# Patient Record
Sex: MALE | URBAN - METROPOLITAN AREA
[De-identification: names, ages, dates, MRNs, and addresses within clinical notes are randomized per-mention and may not be internally consistent; named-entity substitution may affect disease eponyms.]

---

## 2024-01-17 ENCOUNTER — OFFICE VISIT (OUTPATIENT)
Dept: URBAN - METROPOLITAN AREA CLINIC 63 | Facility: CLINIC | Age: 74
End: 2024-01-17
Payer: MEDICARE

## 2024-01-17 VITALS
BODY MASS INDEX: 28.92 KG/M2 | WEIGHT: 232.6 LBS | HEIGHT: 75 IN | HEART RATE: 77 BPM | SYSTOLIC BLOOD PRESSURE: 140 MMHG | TEMPERATURE: 98.6 F | DIASTOLIC BLOOD PRESSURE: 80 MMHG | OXYGEN SATURATION: 93 %

## 2024-01-17 DIAGNOSIS — R19.7 DIARRHEA OF PRESUMED INFECTIOUS ORIGIN: ICD-10-CM

## 2024-01-17 PROCEDURE — 99204 OFFICE O/P NEW MOD 45 MIN: CPT | Performed by: NURSE PRACTITIONER

## 2024-01-17 RX ORDER — TAMSULOSIN HYDROCHLORIDE 0.4 MG/1
CAPSULE ORAL
Qty: 90 CAPSULE | Status: ACTIVE | COMMUNITY

## 2024-01-17 RX ORDER — LEVOTHYROXINE SODIUM 175 UG/1
TAKE 1 TABLET BY MOUTH FOR 3 DAYS A WEEK TABLET ORAL
Qty: 36 EACH | Refills: 0 | Status: ACTIVE | COMMUNITY

## 2024-01-17 RX ORDER — MESALAMINE 800 MG/1
TAKE 1 TABLET BY MOUTH THREE TIMES A DAY AS DIRECTED FOR 90 DAYS TABLET, DELAYED RELEASE ORAL
Qty: 270 EACH | Refills: 0 | Status: ACTIVE | COMMUNITY

## 2024-01-17 RX ORDER — LEVOTHYROXINE SODIUM 150 UG/1
TAKE 1 TABLET BY MOUTH 4 TIMES PER WEEK TABLET ORAL
Qty: 48 EACH | Refills: 0 | Status: ACTIVE | COMMUNITY

## 2024-01-17 RX ORDER — METOPROLOL SUCCINATE 50 MG/1
TABLET, EXTENDED RELEASE ORAL
Qty: 90 TABLET | Status: ACTIVE | COMMUNITY

## 2024-01-17 RX ORDER — BUDESONIDE 3 MG/1
CAPSULE, GELATIN COATED ORAL
Qty: 90 CAPSULE | Status: ACTIVE | COMMUNITY

## 2024-01-17 NOTE — HPI-TODAY'S VISIT:
Thank you very much for kindly referring Nikita Oro (Jim), a very pleasant 73-year-old male, to our service for colitis.  Past medical history significant for hypertension, hyperlipidemia, DM2, hypothyroidism, allergic rhinitis, BPH and sleep apnea (CPAP).  Past surgical history is significant for lip squamous cell carcinoma removal.  He reports a paternal history of colon cancer (age 55) but denies other family members with colon polyps, colon cancer or other GI malignancy.  He reports his last complete colonoscopy was July 2020, Dr. Yeung, and he states he was diagnosed with "bacterial colitis".  Because of that diagnosis, he was placed on mesalamine, 800 mg, 3 times daily, which she has been compliant with since that time.  Nick presents today without gastrointestinal complaint and admits to 2-3 unremarkable bowel movements per day of soft stool (Garza 5-7).  He denies abdominal pain, tenesmus, rectal bleeding, nausea, vomiting, pyrosis, dysphagia, dyspepsia, change in bowel habits, melena or unintentional weight loss.

## 2024-01-17 NOTE — HPI-PREVIOUS LABS
Lab work dated 13 January 2024 demonstrates the following abnormalities: Cholesterol 247, , non-, glucose 126, CO2 34, hemoglobin A1c 6.4%, absolute eosinophil 0. All remaining lab values of CBC, CMP and lipid panel are within normal limits.

## 2024-01-24 ENCOUNTER — LAB OUTSIDE AN ENCOUNTER (OUTPATIENT)
Dept: URBAN - METROPOLITAN AREA CLINIC 63 | Facility: CLINIC | Age: 74
End: 2024-01-24

## 2024-02-03 LAB
CALPROTECTIN, FECAL: 177
CAMPYLOBACTER SPP. AG,EIA: (no result)
CLOSTRIDIUM DIFFICILE: (no result)
CRYPTOSPORIDIUM ANTIGEN,: (no result)
CULTURE: (no result)
CYCLOSPORA AND ISOSPORA EXAMINATION: (no result)
FECAL FAT, QUALITATIVE: (no result)
LACTOFERRIN, FECAL, QUANT.: 31.2
LEUKOCYTES: (no result)
OVA AND PARASITES, CONC AND PERM SMEAR: (no result)
PANCREATIC ELASTASE, FECAL: 182
ROTAVIRUS AG, EIA: (no result)
SALMONELLA AND SHIGELLA, CULTURE: (no result)
SHIGA TOXINS, EIA W/RFL TO E.COLI O157 CULTURE: (no result)

## 2024-02-14 ENCOUNTER — COLON (OUTPATIENT)
Dept: URBAN - METROPOLITAN AREA SURGERY CENTER 4 | Facility: SURGERY CENTER | Age: 74
End: 2024-02-14
Payer: MEDICARE

## 2024-02-14 DIAGNOSIS — K64.1 SECOND DEGREE HEMORRHOIDS: ICD-10-CM

## 2024-02-14 DIAGNOSIS — K57.30 DIVERTICULOSIS OF LARGE INTESTINE WITHOUT PERFORATION OR ABSCESS WITHOUT BLEEDING: ICD-10-CM

## 2024-02-14 DIAGNOSIS — R19.7 DIARRHEA, UNSPECIFIED TYPE: ICD-10-CM

## 2024-02-14 PROCEDURE — 45380 COLONOSCOPY AND BIOPSY: CPT | Performed by: INTERNAL MEDICINE

## 2024-02-14 RX ORDER — BUDESONIDE 3 MG/1
CAPSULE, GELATIN COATED ORAL
Qty: 90 CAPSULE | Status: ACTIVE | COMMUNITY

## 2024-02-14 RX ORDER — LEVOTHYROXINE SODIUM 150 UG/1
TAKE 1 TABLET BY MOUTH 4 TIMES PER WEEK TABLET ORAL
Qty: 48 EACH | Refills: 0 | Status: ACTIVE | COMMUNITY

## 2024-02-14 RX ORDER — TAMSULOSIN HYDROCHLORIDE 0.4 MG/1
CAPSULE ORAL
Qty: 90 CAPSULE | Status: ACTIVE | COMMUNITY

## 2024-02-14 RX ORDER — MESALAMINE 800 MG/1
TAKE 1 TABLET BY MOUTH THREE TIMES A DAY AS DIRECTED FOR 90 DAYS TABLET, DELAYED RELEASE ORAL
Qty: 270 EACH | Refills: 0 | Status: ACTIVE | COMMUNITY

## 2024-02-14 RX ORDER — LEVOTHYROXINE SODIUM 175 UG/1
TAKE 1 TABLET BY MOUTH FOR 3 DAYS A WEEK TABLET ORAL
Qty: 36 EACH | Refills: 0 | Status: ACTIVE | COMMUNITY

## 2024-02-14 RX ORDER — METOPROLOL SUCCINATE 50 MG/1
TABLET, EXTENDED RELEASE ORAL
Qty: 90 TABLET | Status: ACTIVE | COMMUNITY

## 2024-03-12 ENCOUNTER — TELEP (OUTPATIENT)
Dept: URBAN - METROPOLITAN AREA TELEHEALTH 1 | Facility: TELEHEALTH | Age: 74
End: 2024-03-12
Payer: MEDICARE

## 2024-03-12 VITALS — HEIGHT: 75 IN | BODY MASS INDEX: 28.6 KG/M2 | WEIGHT: 230 LBS

## 2024-03-12 DIAGNOSIS — K52.832 LYMPHOCYTIC COLITIS: ICD-10-CM

## 2024-03-12 DIAGNOSIS — K64.1 GRADE II HEMORRHOIDS: ICD-10-CM

## 2024-03-12 DIAGNOSIS — K57.30 PANCOLONIC DIVERTICULOSIS: ICD-10-CM

## 2024-03-12 PROBLEM — 1187071008: Status: ACTIVE | Noted: 2024-03-12

## 2024-03-12 PROCEDURE — 99214 OFFICE O/P EST MOD 30 MIN: CPT | Performed by: NURSE PRACTITIONER

## 2024-03-12 RX ORDER — BUDESONIDE 3 MG/1
AS DIRECTED CAPSULE, DELAYED RELEASE PELLETS ORAL
Qty: 150 CAPSULE | Refills: 0 | OUTPATIENT
Start: 2024-03-12

## 2024-03-12 RX ORDER — TAMSULOSIN HYDROCHLORIDE 0.4 MG/1
CAPSULE ORAL
Qty: 90 CAPSULE | Status: ACTIVE | COMMUNITY

## 2024-03-12 RX ORDER — METOPROLOL SUCCINATE 50 MG/1
TABLET, EXTENDED RELEASE ORAL
Qty: 90 TABLET | Status: ACTIVE | COMMUNITY

## 2024-03-12 RX ORDER — LEVOTHYROXINE SODIUM 150 UG/1
TAKE 1 TABLET BY MOUTH 4 TIMES PER WEEK TABLET ORAL
Qty: 48 EACH | Refills: 0 | Status: ACTIVE | COMMUNITY

## 2024-03-12 RX ORDER — LEVOTHYROXINE SODIUM 175 UG/1
TAKE 1 TABLET BY MOUTH FOR 3 DAYS A WEEK TABLET ORAL
Qty: 36 EACH | Refills: 0 | Status: ACTIVE | COMMUNITY

## 2024-03-12 RX ORDER — BUDESONIDE 3 MG/1
CAPSULE, GELATIN COATED ORAL
Qty: 90 CAPSULE | Status: ACTIVE | COMMUNITY

## 2024-03-12 RX ORDER — TADALAFIL 5 MG/1
1 TABLET AS NEEDED TABLET, FILM COATED ORAL ONCE A DAY
Status: ACTIVE | COMMUNITY

## 2024-03-12 NOTE — HPI-PREVIOUS PROCEDURES
Colonoscopy/14 February 2024.  Pandiverticulosis with internal hemorrhoids present.  The examination was otherwise normal on direct and retroflexion views.  The ascending colon biopsy demonstrates patchy areas suggestive of collagenous colitis and both the ascending colon and rectal biopsies demonstrate lymphocytic colitis.  All remaining findings are negative or benign.  No further screening colonoscopy is recommended due to age and comorbidity. ********** Colonoscopy July 2020, Dr. Yeung, diagnosed with "bacterial colitis."

## 2024-03-12 NOTE — HPI-TODAY'S VISIT:
Nikita Oro (Jim) is a very pleasant 73-year-old male seen in follow-up of surveillance colonoscopy (see results below).  He admits to tolerating the procedure very easily without any postprocedure complications.  His bowel habits remain unchanged, averaging 3-5 bowel movements per day of loose stool, consistent with findings of lymphocytic colitis on colonoscopy.  He was prescribed budesonide by his PCP, but states that his pharmacy did not have it available to dispense when he went to pick it up.  He is currently using Imodium with otherwise moderate efficacy and is without other gastrointestinal complaint.

## 2024-05-03 ENCOUNTER — TELEPHONE ENCOUNTER (OUTPATIENT)
Dept: URBAN - METROPOLITAN AREA CLINIC 63 | Facility: CLINIC | Age: 74
End: 2024-05-03

## 2024-05-03 RX ORDER — BUDESONIDE 3 MG/1
AS DIRECTED CAPSULE, DELAYED RELEASE PELLETS ORAL
Qty: 150 CAPSULE | Refills: 0
Start: 2024-03-12

## 2024-05-06 ENCOUNTER — TELEPHONE ENCOUNTER (OUTPATIENT)
Dept: URBAN - METROPOLITAN AREA CLINIC 63 | Facility: CLINIC | Age: 74
End: 2024-05-06

## 2024-07-01 ENCOUNTER — ERX REFILL RESPONSE (OUTPATIENT)
Dept: URBAN - METROPOLITAN AREA CLINIC 63 | Facility: CLINIC | Age: 74
End: 2024-07-01

## 2024-07-01 ENCOUNTER — TELEPHONE ENCOUNTER (OUTPATIENT)
Dept: URBAN - METROPOLITAN AREA CLINIC 63 | Facility: CLINIC | Age: 74
End: 2024-07-01

## 2024-07-01 RX ORDER — BUDESONIDE 3 MG/1
AS DIRECTED CAPSULE, DELAYED RELEASE PELLETS ORAL
Qty: 150 CAPSULE | Refills: 0 | OUTPATIENT

## 2024-07-01 RX ORDER — BUDESONIDE 3 MG/1
DIRECTED CAPSULE, DELAYED RELEASE PELLETS ORAL
Qty: 160 | Refills: 0

## 2024-07-05 ENCOUNTER — TELEPHONE ENCOUNTER (OUTPATIENT)
Dept: URBAN - METROPOLITAN AREA CLINIC 63 | Facility: CLINIC | Age: 74
End: 2024-07-05

## 2024-07-16 ENCOUNTER — TELEPHONE ENCOUNTER (OUTPATIENT)
Dept: URBAN - METROPOLITAN AREA CLINIC 63 | Facility: CLINIC | Age: 74
End: 2024-07-16

## 2024-07-16 RX ORDER — BUDESONIDE 3 MG/1
3 CAPSULES CAPSULE, DELAYED RELEASE PELLETS ORAL ONCE A DAY
Qty: 180 | Refills: 3 | OUTPATIENT
Start: 2024-07-16

## 2024-10-09 ENCOUNTER — ERX REFILL RESPONSE (OUTPATIENT)
Dept: URBAN - METROPOLITAN AREA CLINIC 63 | Facility: CLINIC | Age: 74
End: 2024-10-09

## 2024-10-09 RX ORDER — BUDESONIDE 3 MG/1
DIRECTED CAPSULE, DELAYED RELEASE PELLETS ORAL
Qty: 160 | Refills: 0 | OUTPATIENT

## 2024-10-09 RX ORDER — BUDESONIDE 3 MG/1
DIRECTED ORALLY 3 TABLETS ONCE DAILY FOR 40 DAYS, THEN 2 TABS ONCE DAILY FOR 10 DAYS, THEN 1 TABLET ONCE DAILY FOR 10 DAYS FOR A TOTAL OF 60 DAYS CAPSULE, GELATIN COATED ORAL
Qty: 240 CAPSULE | Refills: 1 | OUTPATIENT